# Patient Record
(demographics unavailable — no encounter records)

---

## 2025-01-23 NOTE — ASSESSMENT
[FreeTextEntry1] : This pleasant  gentleman presents with primary subfertilty, impaired semen quality and bilateral varicoceles.  I have requested several baseline blood studies, a semen analysis and additional imaging.  Urine dip and microscopic analysis was requested. We discussed his evaluation today and possible options for therapy depending upon the results of his testing. I will make more specific recommendations after the results of the requested tests return. 1. Review blood studies and semen analysis on follow up 2. Discuss options for therapy on follow up  Telehealth Consultation: 50 minutes reviewing his history and discussing prior results, discussing various treatment options, writing requests for lab testing and writing his note. There was also additional time in preparing for the visit and assisting the patient with technology issues he was having with the telehealth platform.

## 2025-01-23 NOTE — HISTORY OF PRESENT ILLNESS
[FreeTextEntry1] : The patient-doctor. relationship has been established in a face-to-face fashion on real-time video audio HIPAA compliant communication using telemedicine software. The patient, Zoila Martínez was at home and participated in the telephonic visit with the Physician Tang Jeffers MD PhD who was in his medical office. The patient's identity has been confirmed.  The patient was previously emailed a copy of the telemedicine consent.  The patient has had a chance to review and has now given verbal consent and has requested care to be assessed and treated through telemedicine. The patient understands there may be limitations in this process and that they need not need further follow-up care in the office and/or hospital settings.   Verbal consent was given on Thursday, January 23, 2025 at 9 AM by the patient.  It was requested by the physician.  A written consent was previously sent for the patient to sign and return.  Patient is a 32-year-old gentleman who presents with the chief complaint of impaired fertility for evaluation. The patient denies fevers, chills, nausea and/or vomiting and no unexplained weight loss.   I reviewed the questionnaire he had completed with him in detail.  His wife, age 32, was not able to accompany him to the visit today. She has not had any prior pregnancies. As I understand her evaluation has been normal to date except for a low AMH and hypothyroidism (Dr. Kang). They have been trying to achieve pregnancy for the past 6 months without conception. This issue causes both he and his partner significant distress.   He has no known drug allergies.  His past medical history demonstrates no significant urologic issues (see patient questionnaire).  In his present occupation as a   ([employer]) he has no known toxin exposure.  He does not smoke and drinks only socially.  He has no known drug allergies.  His review of systems is non-contributory. His family history is not significant.

## 2025-01-23 NOTE — LETTER BODY
[FreeTextEntry1] : Dear ,  Thank you for referring your patient Zoila Martínez for consultation for impaired fertility.  I have requested several baseline blood studies and a semen analysis. I will see the patient back in followup shortly and make further recommendations. I have attached a copy of my consultation note for your records.  Thank you again for this kind referral. I will certainly keep you updated with further progress. Please do not hesitate to call me if you have any questions.  Best regards,    Tang Jeffers M.D., PhD Professor of Urology    [facility]  for , Reproductive and Sexual Medicine    [facility]

## 2025-01-23 NOTE — LETTER BODY
[FreeTextEntry1] : Dear ,  Thank you for referring your patient Zoila Martínez for consultation for impaired fertility.  I have requested several baseline blood studies and a semen analysis. I will see the patient back in followup shortly and make further recommendations. I have attached a copy of my consultation note for your records.  Thank you again for this kind referral. I will certainly keep you updated with further progress. Please do not hesitate to call me if you have any questions.  Best regards,    Tang Jeffers M.D., PhD Professor of Urology    [facility]  for , Reproductive and Sexual Medicine    [facility]

## 2025-01-23 NOTE — LETTER BODY
[FreeTextEntry1] : Dear ,  Thank you for referring your patient Zoila Martínez for consultation for impaired fertility.  I have requested several baseline blood studies and a semen analysis. I will see the patient back in followup shortly and make further recommendations. I have attached a copy of my consultation note for your records.  Thank you again for this kind referral. I will certainly keep you updated with further progress. Please do not hesitate to call me if you have any questions.  Best regards,    Tang Jeffers M.D., PhD Professor of Urology    [facility]  for , Reproductive and Sexual Medicine    [facility]

## 2025-02-06 NOTE — ASSESSMENT
[FreeTextEntry1] : The patient returns for follow-up to review his recent blood studies and discuss options for therapy. y issues he was having with the telehealth platform.  Laboratory testing dated November 4 laboratory studies dated January 30, 2025 demonstrated an LH of 3.3 IU/L, estradiol 42 pg/mL, vitamin D 25 was 47.9 ng/mL, prolactin 8.4 ng/mL, FSH 2.5 IU/L, TSH 1.23 IU/mL, hematocrit 42.9%.  His urinalysis was normal.  Hemoglobin A1c was 6% and medical evaluation suggested.  His CRP was 5.98 mg/L and again medical evaluation was suggested.  His LDLs were elevated at 117 mg/dL. Inhibin B was elevated 842.6 pg/mL.  His testosterone free was low at 3 pg/mL with a total testosterone of 259 ng/dL.    A semen analysis dated 1/14/2025 demonstrated a volume of 2.5 mL with a concentration that was low at 576,000/mL there was no motility and no morphology performed.  The round cells visualized were stated by the lab to be primarily immature terminal forms.  With his relative hypogonadotrophic hypogonadism I have suggested starting on clomiphene citrate.  We discussed the relative risks and benefits as well as the proper usage of the medication.  He will start on 25 mg daily.  I have also recommended a pituitary MRI given his relative hypogonadotrophic hypogonadism.  I have also started him on clomiphene citrate 25 mg a day and have requested blood studies in 3 weeks.  We will discuss the results of the blood tests and pituitary MRI 2 weeks after that. I would like to see him back for physical examination and scrotal ultrasound.  We also began the discussion of TESE for both diagnosis and sperm retrieval if sperm quality does not improve.  Telehealth Consultation: 35 minutes reviewing his history and discussing prior results, discussing various treatment options, writing medication prescriptions, requests for lab testing and writing his note. There was also additional time in preparing for the visit and assisting the patient with technology issues he was having with the telehealth platform.

## 2025-02-06 NOTE — HISTORY OF PRESENT ILLNESS
[FreeTextEntry1] : The patient-doctor. relationship has been established in a face-to-face fashion on real-time video audio HIPAA compliant communication using telemedicine software. The patient, Zoila Martínez was at home and participated in the telephonic visit with the Physician Tang Jeffers MD PhD who was in his medical office. The patient's identity has been confirmed.  The patient was previously emailed a copy of the telemedicine consent.  The patient has had a chance to review and has now given verbal consent and has requested care to be assessed and treated through telemedicine. The patient understands there may be limitations in this process and that they need not need further follow-up care in the office and/or hospital settings.   Verbal consent was given on Thursday,Thursday, February 6 , 2025 at 9 AM by the patient.  It was requested by the physician.  A written consent was previously sent for the patient to sign and return.  The patient returns for follow-up to review his recent blood studies and discuss options for therapy.  PMH: Patient is a 32-year-old gentleman who presents with the chief complaint of impaired fertility for evaluation. The patient denies fevers, chills, nausea and/or vomiting and no unexplained weight loss.   I reviewed the questionnaire he had completed with him in detail.  His wife, age 32, was not able to accompany him to the visit today. She has not had any prior pregnancies. As I understand her evaluation has been normal to date except for a low AMH and hypothyroidism (Dr. Kang). They have been trying to achieve pregnancy for the past 6 months without conception. This issue causes both he and his partner significant distress.   He has no known drug allergies.  His past medical history demonstrates no significant urologic issues (see patient questionnaire).  In his present occupation as a   ([employer]) he has no known toxin exposure.  He does not smoke and drinks only socially.  He has no known drug allergies.  His review of systems is non-contributory. His family history is not significant.

## 2025-03-11 NOTE — ASSESSMENT
[FreeTextEntry1] : The patient returns for follow-up.  He was last seen February 6, 2025.  He never started clomiphene citrates since it was too expensive at his [facility] pharmacy.  I rewrote the medication for [employer] for the compounded medication.  Laboratory testing dated November 4 laboratory studies dated January 30, 2025 demonstrated an LH of 3.3 IU/L, estradiol 42 pg/mL, vitamin D 25 was 47.9 ng/mL, prolactin 8.4 ng/mL, FSH 2.5 IU/L, TSH 1.23 IU/mL, hematocrit 42.9%.  His urinalysis was normal.  Hemoglobin A1c was 6% and medical evaluation suggested.  His CRP was 5.98 mg/L and again medical evaluation was suggested.  His LDLs were elevated at 117 mg/dL. Inhibin B was elevated 842.6 pg/mL.  His testosterone free was low at 3 pg/mL with a total testosterone of 259 ng/dL.    A semen analysis dated 1/14/2025 demonstrated a volume of 2.5 mL with a concentration that was low at 576,000/mL there was no motility and no morphology performed.  The round cells visualized were stated by the lab to be primarily immature terminal forms.  With his relative hypogonadotrophic hypogonadism  We discussed the relative risks and benefits as well as the proper usage of the medication.  He will start on 25 mg of compounded clomiphene citrates every other day.  We will obtain blood studies in 3 weeks and discussed the results with him 2 weeks later.  I have also recommended a pituitary MRI given his relative hypogonadotrophic hypogonadism.  I have also started him on clomiphene citrate 25 mg a day and have requested blood studies in 3 weeks.  We will discuss the results of the blood tests and pituitary MRI 2 weeks after that. I would like to see him back for physical examination and scrotal ultrasound.  We also began the discussion of TESE for both diagnosis and sperm retrieval if sperm quality does not improve.  Telehealth Consultation: 35 minutes reviewing his history and discussing prior results, discussing various treatment options, writing medication prescriptions, requests for lab testing and writing his note. There was also additional time in preparing for the visit and assisting the patient with technology issues he was having with the telehealth platform.

## 2025-03-11 NOTE — HISTORY OF PRESENT ILLNESS
[FreeTextEntry1] : The patient-doctor. relationship has been established in a face-to-face fashion on real-time video audio HIPAA compliant communication using telemedicine software. The patient, Zoila Martínez was at home and participated in the telephonic visit with the Physician Tang Jeffers MD PhD who was in his medical office. The patient's identity has been confirmed.  The patient was previously emailed a copy of the telemedicine consent.  The patient has had a chance to review and has now given verbal consent and has requested care to be assessed and treated through telemedicine. The patient understands there may be limitations in this process and that they need not need further follow-up care in the office and/or hospital settings.   Verbal consent was given on Tuesday, March 11, 2025 5 at 9 AM by the patient.  It was requested by the physician.  A written consent was previously sent for the patient to sign and return.  The patient returns for follow-up.  He was last seen February 6, 2025.  PMH: Patient is a 32-year-old gentleman who presents with the chief complaint of impaired fertility for evaluation. The patient denies fevers, chills, nausea and/or vomiting and no unexplained weight loss.   I reviewed the questionnaire he had completed with him in detail.  His wife, age 32, was not able to accompany him to the visit today. She has not had any prior pregnancies. As I understand her evaluation has been normal to date except for a low AMH and hypothyroidism (Dr. Kang). They have been trying to achieve pregnancy for the past 6 months without conception. This issue causes both he and his partner significant distress.   He has no known drug allergies.  His past medical history demonstrates no significant urologic issues (see patient questionnaire).  In his present occupation as a   ([employer]) he has no known toxin exposure.  He does not smoke and drinks only socially.  He has no known drug allergies.  His review of systems is non-contributory. His family history is not significant.

## 2025-03-11 NOTE — ASSESSMENT
[FreeTextEntry1] : The patient returns for follow-up.  He was last seen February 6, 2025.  He never started clomiphene citrates since it was too expensive at his [facility] pharmacy.  I rewrote the medication for [employer] for the compounded medication.  Laboratory testing dated November 4 laboratory studies dated January 30, 2025 demonstrated an LH of 3.3 IU/L, estradiol 42 pg/mL, vitamin D 25 was 47.9 ng/mL, prolactin 8.4 ng/mL, FSH 2.5 IU/L, TSH 1.23 IU/mL, hematocrit 42.9%.  His urinalysis was normal.  Hemoglobin A1c was 6% and medical evaluation suggested.  His CRP was 5.98 mg/L and again medical evaluation was suggested.  His LDLs were elevated at 117 mg/dL. Inhibin B was elevated 842.6 pg/mL.  His testosterone free was low at 3 pg/mL with a total testosterone of 259 ng/dL.    A semen analysis dated 1/14/2025 demonstrated a volume of 2.5 mL with a concentration that was low at 576,000/mL there was no motility and no morphology performed.  The round cells visualized were stated by the lab to be primarily immature terminal forms.  With his relative hypogonadotrophic hypogonadism  We discussed the relative risks and benefits as well as the proper usage of the medication.  He will start on 25 mg of compounded clomiphene citrates every other day.  We will obtain blood studies in 3 weeks and discussed the results with him 2 weeks later.  I have also recommended a pituitary MRI given his relative hypogonadotrophic hypogonadism.  I have also started him on clomiphene citrate 25 mg a day and have requested blood studies in 3 weeks.  We will discuss the results of the blood tests and pituitary MRI 2 weeks after that. I would like to see him back for physical examination and scrotal ultrasound.  We also began the discussion of TESE for both diagnosis and sperm retrieval if sperm quality does not improve.  Telehealth Consultation: 35 minutes reviewing his history and discussing prior results, discussing various treatment options, writing medication prescriptions, requests for lab testing and writing his note. There was also additional time in preparing for the visit and assisting the patient with technology issues he was having with the telehealth platform.